# Patient Record
Sex: MALE | URBAN - METROPOLITAN AREA
[De-identification: names, ages, dates, MRNs, and addresses within clinical notes are randomized per-mention and may not be internally consistent; named-entity substitution may affect disease eponyms.]

---

## 2019-12-11 ENCOUNTER — APPOINTMENT (RX ONLY)
Dept: URBAN - METROPOLITAN AREA CLINIC 282 | Facility: CLINIC | Age: 44
Setting detail: DERMATOLOGY
End: 2019-12-11

## 2019-12-11 DIAGNOSIS — B07.0 PLANTAR WART: ICD-10-CM

## 2019-12-11 DIAGNOSIS — L85.3 XEROSIS CUTIS: ICD-10-CM

## 2019-12-11 DIAGNOSIS — D18.0 HEMANGIOMA: ICD-10-CM

## 2019-12-11 DIAGNOSIS — L82.1 OTHER SEBORRHEIC KERATOSIS: ICD-10-CM

## 2019-12-11 DIAGNOSIS — L81.4 OTHER MELANIN HYPERPIGMENTATION: ICD-10-CM

## 2019-12-11 DIAGNOSIS — D22 MELANOCYTIC NEVI: ICD-10-CM

## 2019-12-11 PROBLEM — D22.5 MELANOCYTIC NEVI OF TRUNK: Status: ACTIVE | Noted: 2019-12-11

## 2019-12-11 PROBLEM — D18.01 HEMANGIOMA OF SKIN AND SUBCUTANEOUS TISSUE: Status: ACTIVE | Noted: 2019-12-11

## 2019-12-11 PROCEDURE — ? SUNSCREEN RECOMMENDATIONS

## 2019-12-11 PROCEDURE — ? PATIENT SPECIFIC COUNSELING

## 2019-12-11 PROCEDURE — 99202 OFFICE O/P NEW SF 15 MIN: CPT

## 2019-12-11 PROCEDURE — ? FULL BODY SKIN EXAM

## 2019-12-11 PROCEDURE — ? COUNSELING

## 2019-12-11 ASSESSMENT — LOCATION DETAILED DESCRIPTION DERM
LOCATION DETAILED: RIGHT POPLITEAL SKIN
LOCATION DETAILED: LEFT INFERIOR UPPER BACK
LOCATION DETAILED: PERIUMBILICAL SKIN
LOCATION DETAILED: LEFT MEDIAL SUPERIOR CHEST
LOCATION DETAILED: LEFT ANTERIOR PROXIMAL THIGH
LOCATION DETAILED: LEFT ANTERIOR SHOULDER
LOCATION DETAILED: RIGHT SUPERIOR UPPER BACK
LOCATION DETAILED: LEFT LATERAL PLANTAR MIDFOOT

## 2019-12-11 ASSESSMENT — LOCATION SIMPLE DESCRIPTION DERM
LOCATION SIMPLE: LEFT THIGH
LOCATION SIMPLE: RIGHT UPPER BACK
LOCATION SIMPLE: CHEST
LOCATION SIMPLE: RIGHT POPLITEAL SKIN
LOCATION SIMPLE: LEFT UPPER BACK
LOCATION SIMPLE: LEFT SHOULDER
LOCATION SIMPLE: ABDOMEN
LOCATION SIMPLE: LEFT PLANTAR SURFACE

## 2019-12-11 ASSESSMENT — LOCATION ZONE DERM
LOCATION ZONE: LEG
LOCATION ZONE: FEET
LOCATION ZONE: ARM
LOCATION ZONE: TRUNK

## 2020-06-13 ENCOUNTER — HOSPITAL ENCOUNTER (OUTPATIENT)
Dept: HOSPITAL 47 - RADMRIMAIN | Age: 45
Discharge: HOME | End: 2020-06-13
Attending: RADIOLOGY
Payer: COMMERCIAL

## 2020-06-13 DIAGNOSIS — S76.112A: Primary | ICD-10-CM

## 2020-06-14 NOTE — MR
EXAMINATION TYPE: MR knee LT wo con

 

DATE OF EXAM: 6/13/2020

 

COMPARISON: None

 

HISTORY: 44-year-old male Quadriceps Tendon Injury, pain x 1 week

 

TECHNIQUE: Multiplanar, multisequence imaging of the left knee is performed without IV contrast.

 

FINDINGS:

ACL, PCL, MCL, and LCL complex appear intact.

 

Minimal degenerative signal versus vascular pedicle in the posterior horn of the lateral meniscus wit
hout discrete tear. Overall lateral compartment articular cartilage volume is maintained.

 

There is obliquely oriented signal within the posterior horn of the medial meniscus. Signal does not 
clearly contact either articular surface at this time, refer to sagittal series 301 image 26. Overall
 medial compartment articular cartilage is maintained.

 

Patellofemoral compartment shows a moderate thickness area of focal cartilage loss along the trochlea
r groove measuring 7 mm craniocaudal and 6 mm wide. Otherwise, patellofemoral compartment articular c
artilage volume is maintained.

 

There is edematous change of the quadriceps tendon and overlying soft tissues with a small 1 cm long 
and 3 mm wide partial-thickness tear of the superficial fibers, mid to lateral aspect. A couple addit
ional smaller areas are present in the superficial fibers.

 

Mild increased signal along the deep proximal patellar tendon fibers as well.

 

Small knee joint effusion. No sizable Baker's cyst. Small ganglion at the origin of the medial head g
astrocnemius measuring 1.6 cm.

 

Normal popliteal artery anatomy and muscle bulk. No suspicious bone marrow replacement.

 

 

IMPRESSION: 

 

1. Contusion to the insertion of the quadriceps tendon with a  few small tears of the superficial fib
ers, largest measuring 3 mm wide and 1 cm long.

2. Degenerative signal posterior horn of the medial meniscus without clear meniscal tear at this time
.

3. Moderate thickness focal cartilage loss along the trochlear groove measuring 7 x 6 mm.

4. Mild proximal patellar tendinosis.